# Patient Record
Sex: MALE | Race: WHITE | NOT HISPANIC OR LATINO | Employment: UNEMPLOYED | URBAN - METROPOLITAN AREA
[De-identification: names, ages, dates, MRNs, and addresses within clinical notes are randomized per-mention and may not be internally consistent; named-entity substitution may affect disease eponyms.]

---

## 2019-09-30 ENCOUNTER — HOSPITAL ENCOUNTER (EMERGENCY)
Facility: HOSPITAL | Age: 29
Discharge: PSYCHIATRIC OR SUBSTANCE USE FACILITY | End: 2019-10-01
Attending: EMERGENCY MEDICINE

## 2019-09-30 DIAGNOSIS — F10.929 ACUTE ALCOHOLIC INTOXICATION WITH COMPLICATION: ICD-10-CM

## 2019-09-30 DIAGNOSIS — R45.851 SUICIDAL IDEATION: Primary | ICD-10-CM

## 2019-09-30 DIAGNOSIS — F14.10 COCAINE ABUSE: ICD-10-CM

## 2019-09-30 DIAGNOSIS — F32.2 CURRENT SEVERE EPISODE OF MAJOR DEPRESSIVE DISORDER WITHOUT PSYCHOTIC FEATURES WITHOUT PRIOR EPISODE: ICD-10-CM

## 2019-09-30 LAB
ALBUMIN SERPL BCP-MCNC: 4.9 G/DL (ref 3.5–5.2)
ALP SERPL-CCNC: 59 U/L (ref 55–135)
ALT SERPL W/O P-5'-P-CCNC: 32 U/L (ref 10–44)
AMPHET+METHAMPHET UR QL: NEGATIVE
ANION GAP SERPL CALC-SCNC: 13 MMOL/L (ref 8–16)
APAP SERPL-MCNC: <10 UG/ML (ref 10–20)
AST SERPL-CCNC: 36 U/L (ref 10–40)
BARBITURATES UR QL SCN>200 NG/ML: NEGATIVE
BASOPHILS # BLD AUTO: 0.04 K/UL (ref 0–0.2)
BASOPHILS NFR BLD: 0.4 % (ref 0–1.9)
BENZODIAZ UR QL SCN>200 NG/ML: NEGATIVE
BILIRUB SERPL-MCNC: 0.7 MG/DL (ref 0.1–1)
BILIRUB UR QL STRIP: NEGATIVE
BUN SERPL-MCNC: 6 MG/DL (ref 6–20)
BZE UR QL SCN: ABNORMAL
CALCIUM SERPL-MCNC: 8.7 MG/DL (ref 8.7–10.5)
CANNABINOIDS UR QL SCN: NEGATIVE
CHLORIDE SERPL-SCNC: 106 MMOL/L (ref 95–110)
CLARITY UR: CLEAR
CO2 SERPL-SCNC: 21 MMOL/L (ref 23–29)
COLOR UR: YELLOW
CREAT SERPL-MCNC: 0.8 MG/DL (ref 0.5–1.4)
CREAT UR-MCNC: 14.9 MG/DL (ref 23–375)
DIFFERENTIAL METHOD: ABNORMAL
EOSINOPHIL # BLD AUTO: 0.1 K/UL (ref 0–0.5)
EOSINOPHIL NFR BLD: 1.1 % (ref 0–8)
ERYTHROCYTE [DISTWIDTH] IN BLOOD BY AUTOMATED COUNT: 11.9 % (ref 11.5–14.5)
EST. GFR  (AFRICAN AMERICAN): >60 ML/MIN/1.73 M^2
EST. GFR  (NON AFRICAN AMERICAN): >60 ML/MIN/1.73 M^2
ETHANOL SERPL-MCNC: 120 MG/DL
ETHANOL SERPL-MCNC: 22 MG/DL
ETHANOL SERPL-MCNC: 22 MG/DL
ETHANOL SERPL-MCNC: 321 MG/DL
GLUCOSE SERPL-MCNC: 92 MG/DL (ref 70–110)
GLUCOSE UR QL STRIP: NEGATIVE
HCT VFR BLD AUTO: 46.5 % (ref 40–54)
HGB BLD-MCNC: 17 G/DL (ref 14–18)
HGB UR QL STRIP: ABNORMAL
IMM GRANULOCYTES # BLD AUTO: 0.03 K/UL (ref 0–0.04)
IMM GRANULOCYTES NFR BLD AUTO: 0.3 % (ref 0–0.5)
KETONES UR QL STRIP: NEGATIVE
LEUKOCYTE ESTERASE UR QL STRIP: NEGATIVE
LYMPHOCYTES # BLD AUTO: 4.4 K/UL (ref 1–4.8)
LYMPHOCYTES NFR BLD: 44.9 % (ref 18–48)
MCH RBC QN AUTO: 33 PG (ref 27–31)
MCHC RBC AUTO-ENTMCNC: 36.6 G/DL (ref 32–36)
MCV RBC AUTO: 90 FL (ref 82–98)
MONOCYTES # BLD AUTO: 0.8 K/UL (ref 0.3–1)
MONOCYTES NFR BLD: 8 % (ref 4–15)
NEUTROPHILS # BLD AUTO: 4.4 K/UL (ref 1.8–7.7)
NEUTROPHILS NFR BLD: 45.3 % (ref 38–73)
NITRITE UR QL STRIP: NEGATIVE
NRBC BLD-RTO: 0 /100 WBC
OPIATES UR QL SCN: NEGATIVE
PCP UR QL SCN>25 NG/ML: NEGATIVE
PH UR STRIP: 6 [PH] (ref 5–8)
PLATELET # BLD AUTO: 194 K/UL (ref 150–350)
PMV BLD AUTO: 9.4 FL (ref 9.2–12.9)
POTASSIUM SERPL-SCNC: 3.5 MMOL/L (ref 3.5–5.1)
PROT SERPL-MCNC: 8.4 G/DL (ref 6–8.4)
PROT UR QL STRIP: NEGATIVE
RBC # BLD AUTO: 5.15 M/UL (ref 4.6–6.2)
SODIUM SERPL-SCNC: 140 MMOL/L (ref 136–145)
SP GR UR STRIP: <=1.005 (ref 1–1.03)
TOXICOLOGY INFORMATION: ABNORMAL
TSH SERPL DL<=0.005 MIU/L-ACNC: 2.65 UIU/ML (ref 0.34–5.6)
URN SPEC COLLECT METH UR: ABNORMAL
UROBILINOGEN UR STRIP-ACNC: NEGATIVE EU/DL
WBC # BLD AUTO: 9.69 K/UL (ref 3.9–12.7)

## 2019-09-30 PROCEDURE — 80320 DRUG SCREEN QUANTALCOHOLS: CPT

## 2019-09-30 PROCEDURE — 85025 COMPLETE CBC W/AUTO DIFF WBC: CPT

## 2019-09-30 PROCEDURE — 36415 COLL VENOUS BLD VENIPUNCTURE: CPT

## 2019-09-30 PROCEDURE — 80307 DRUG TEST PRSMV CHEM ANLYZR: CPT

## 2019-09-30 PROCEDURE — 25000003 PHARM REV CODE 250: Performed by: EMERGENCY MEDICINE

## 2019-09-30 PROCEDURE — 80053 COMPREHEN METABOLIC PANEL: CPT

## 2019-09-30 PROCEDURE — 80320 DRUG SCREEN QUANTALCOHOLS: CPT | Mod: 91

## 2019-09-30 PROCEDURE — 63600175 PHARM REV CODE 636 W HCPCS: Performed by: EMERGENCY MEDICINE

## 2019-09-30 PROCEDURE — 81003 URINALYSIS AUTO W/O SCOPE: CPT | Mod: 59

## 2019-09-30 PROCEDURE — 96374 THER/PROPH/DIAG INJ IV PUSH: CPT

## 2019-09-30 PROCEDURE — 99285 EMERGENCY DEPT VISIT HI MDM: CPT | Mod: 25

## 2019-09-30 PROCEDURE — 84443 ASSAY THYROID STIM HORMONE: CPT

## 2019-09-30 PROCEDURE — S4991 NICOTINE PATCH NONLEGEND: HCPCS | Performed by: EMERGENCY MEDICINE

## 2019-09-30 PROCEDURE — 80329 ANALGESICS NON-OPIOID 1 OR 2: CPT

## 2019-09-30 RX ORDER — IBUPROFEN 200 MG
1 TABLET ORAL
Status: ACTIVE | OUTPATIENT
Start: 2019-09-30 | End: 2019-10-01

## 2019-09-30 RX ORDER — IBUPROFEN 200 MG
TABLET ORAL
Status: DISPENSED
Start: 2019-09-30 | End: 2019-09-30

## 2019-09-30 RX ORDER — LORAZEPAM 2 MG/ML
1 INJECTION INTRAMUSCULAR
Status: COMPLETED | OUTPATIENT
Start: 2019-09-30 | End: 2019-09-30

## 2019-09-30 RX ADMIN — LORAZEPAM 1 MG: 2 INJECTION INTRAMUSCULAR; INTRAVENOUS at 04:09

## 2019-09-30 NOTE — ED NOTES
Patient awake sitting upright in bed. ED physician at bedside to speak with patient. Patient states feels anxious and depressed. Patient with thoughts of suicide. Patient states advised friends and family of his concerns and thoughts. Patient states willing to get placed in psychiatric services. Patient provided with meal tray at this time.

## 2019-09-30 NOTE — ED PROVIDER NOTES
Encounter Date: 9/30/2019       History     Chief Complaint   Patient presents with    Suicidal     29-year-old male presents with acute suicidal ideation stating that multiple life stressors have contributed to his current state  He has lost a prior job position secondary to alcoholism  He is current a bartending  He has multiple friends present in support of him  He appears acutely intoxicated  He does not appear to be distracted with any hallucinations  He has never had inpatient evaluation or stabilization            Review of patient's allergies indicates:   Allergen Reactions    Codeine Other (See Comments)     Ruptured blood vessels.     Lortab [hydrocodone-acetaminophen] Nausea And Vomiting     History reviewed. No pertinent past medical history.  History reviewed. No pertinent surgical history.  No family history on file.  Social History     Tobacco Use    Smoking status: Not on file   Substance Use Topics    Alcohol use: Not on file    Drug use: Not on file     Review of Systems   Constitutional: Negative.    HENT: Negative.    Respiratory: Negative.    Cardiovascular: Negative.    Gastrointestinal: Negative.    Genitourinary: Negative.    Musculoskeletal: Negative.    Skin: Negative.    Neurological: Negative.    Hematological: Negative.    Psychiatric/Behavioral: Positive for dysphoric mood, self-injury (A left wrist self-inflicted bite injury is present at the time of exam) and suicidal ideas. Negative for agitation, decreased concentration, hallucinations and sleep disturbance. The patient is not nervous/anxious and is not hyperactive.    All other systems reviewed and are negative.      Physical Exam     Initial Vitals [09/30/19 0154]   BP Pulse Resp Temp SpO2   (!) 153/103 (!) 111 16 98.4 °F (36.9 °C) 98 %      MAP       --         Physical Exam    Nursing note and vitals reviewed.  Constitutional: He appears well-developed and well-nourished. He is not diaphoretic. No distress.   HENT:   Head:  Normocephalic and atraumatic.   Mouth/Throat: Oropharynx is clear and moist.   Eyes: Conjunctivae and EOM are normal. Pupils are equal, round, and reactive to light.   Neck: Neck supple. Carotid bruit is not present. No JVD present.   Cardiovascular: Regular rhythm, normal heart sounds and intact distal pulses.  No extrasystoles are present.  Tachycardia present.  Exam reveals no gallop and no friction rub.    No murmur heard.  Pulses:       Radial pulses are 2+ on the right side, and 2+ on the left side.   Pulmonary/Chest: Breath sounds normal. No respiratory distress. He has no decreased breath sounds. He has no wheezes. He has no rhonchi. He has no rales. He exhibits no tenderness.   Abdominal: Soft. Bowel sounds are normal. He exhibits no distension and no mass. There is no tenderness. There is no rebound and no guarding.   Musculoskeletal: Normal range of motion. He exhibits no edema or tenderness.   Neurological: He is alert and oriented to person, place, and time. He has normal strength. No cranial nerve deficit or sensory deficit. GCS score is 15. GCS eye subscore is 4. GCS verbal subscore is 5. GCS motor subscore is 6.   Skin: Skin is warm and dry. Capillary refill takes less than 2 seconds. No rash noted. No erythema. No pallor.   Psychiatric: His affect is blunt. His speech is delayed and slurred. He is slowed. He is not agitated, not aggressive, not hyperactive, not withdrawn and not combative. Thought content is not paranoid and not delusional. Cognition and memory are normal. He expresses impulsivity. He does not express inappropriate judgment. He exhibits a depressed mood. He expresses suicidal ideation. He expresses no homicidal ideation. He expresses suicidal plans. He expresses no homicidal plans.         ED Course   Procedures  Labs Reviewed   CBC W/ AUTO DIFFERENTIAL - Abnormal; Notable for the following components:       Result Value    Mean Corpuscular Hemoglobin 33.0 (*)     Mean Corpuscular  Hemoglobin Conc 36.6 (*)     All other components within normal limits   COMPREHENSIVE METABOLIC PANEL - Abnormal; Notable for the following components:    CO2 21 (*)     All other components within normal limits   URINALYSIS, REFLEX TO URINE CULTURE - Abnormal; Notable for the following components:    Occult Blood UA Trace (*)     All other components within normal limits    Narrative:     Preferred Collection Type->Urine, Clean Catch   DRUG SCREEN PANEL, URINE EMERGENCY - Abnormal; Notable for the following components:    Creatinine, Random Ur 14.9 (*)     All other components within normal limits    Narrative:     Preferred Collection Type->Urine, Clean Catch   ALCOHOL,MEDICAL (ETHANOL) - Abnormal; Notable for the following components:    Alcohol, Medical, Serum 321 (*)     All other components within normal limits    Narrative:     ETOH critical result(s) called and verbal readback obtained from   Sandra Rod RN ED. , 09/30/2019 03:38   ACETAMINOPHEN LEVEL   TSH         Admission on 09/30/2019   Component Date Value Ref Range Status    WBC 09/30/2019 9.69  3.90 - 12.70 K/uL Final    RBC 09/30/2019 5.15  4.60 - 6.20 M/uL Final    Hemoglobin 09/30/2019 17.0  14.0 - 18.0 g/dL Final    Hematocrit 09/30/2019 46.5  40.0 - 54.0 % Final    Mean Corpuscular Volume 09/30/2019 90  82 - 98 fL Final    Mean Corpuscular Hemoglobin 09/30/2019 33.0* 27.0 - 31.0 pg Final    Mean Corpuscular Hemoglobin Conc 09/30/2019 36.6* 32.0 - 36.0 g/dL Final    RDW 09/30/2019 11.9  11.5 - 14.5 % Final    Platelets 09/30/2019 194  150 - 350 K/uL Final    MPV 09/30/2019 9.4  9.2 - 12.9 fL Final    Immature Granulocytes 09/30/2019 0.3  0.0 - 0.5 % Final    Gran # (ANC) 09/30/2019 4.4  1.8 - 7.7 K/uL Final    Immature Grans (Abs) 09/30/2019 0.03  0.00 - 0.04 K/uL Final    Comment: Mild elevation in immature granulocytes is non specific and   can be seen in a variety of conditions including stress response,   acute  inflammation, trauma and pregnancy. Correlation with other   laboratory and clinical findings is essential.      Lymph # 09/30/2019 4.4  1.0 - 4.8 K/uL Final    Mono # 09/30/2019 0.8  0.3 - 1.0 K/uL Final    Eos # 09/30/2019 0.1  0.0 - 0.5 K/uL Final    Baso # 09/30/2019 0.04  0.00 - 0.20 K/uL Final    nRBC 09/30/2019 0  0 /100 WBC Final    Gran% 09/30/2019 45.3  38.0 - 73.0 % Final    Lymph% 09/30/2019 44.9  18.0 - 48.0 % Final    Mono% 09/30/2019 8.0  4.0 - 15.0 % Final    Eosinophil% 09/30/2019 1.1  0.0 - 8.0 % Final    Basophil% 09/30/2019 0.4  0.0 - 1.9 % Final    Differential Method 09/30/2019 Automated   Final    Sodium 09/30/2019 140  136 - 145 mmol/L Final    Potassium 09/30/2019 3.5  3.5 - 5.1 mmol/L Final    Chloride 09/30/2019 106  95 - 110 mmol/L Final    CO2 09/30/2019 21* 23 - 29 mmol/L Final    Glucose 09/30/2019 92  70 - 110 mg/dL Final    BUN, Bld 09/30/2019 6  6 - 20 mg/dL Final    Creatinine 09/30/2019 0.8  0.5 - 1.4 mg/dL Final    Calcium 09/30/2019 8.7  8.7 - 10.5 mg/dL Final    Total Protein 09/30/2019 8.4  6.0 - 8.4 g/dL Final    Albumin 09/30/2019 4.9  3.5 - 5.2 g/dL Final    Total Bilirubin 09/30/2019 0.7  0.1 - 1.0 mg/dL Final    Comment: For infants and newborns, interpretation of results should be based  on gestational age, weight and in agreement with clinical  observations.  Premature Infant recommended reference ranges:  Up to 24 hours.............<8.0 mg/dL  Up to 48 hours............<12.0 mg/dL  3-5 days..................<15.0 mg/dL  6-29 days.................<15.0 mg/dL      Alkaline Phosphatase 09/30/2019 59  55 - 135 U/L Final    AST 09/30/2019 36  10 - 40 U/L Final    ALT 09/30/2019 32  10 - 44 U/L Final    Anion Gap 09/30/2019 13  8 - 16 mmol/L Final    eGFR if African American 09/30/2019 >60.0  >60 mL/min/1.73 m^2 Final    eGFR if non African American 09/30/2019 >60.0  >60 mL/min/1.73 m^2 Final    Comment: Calculation used to obtain the estimated  glomerular filtration  rate (eGFR) is the CKD-EPI equation.       Specimen UA 09/30/2019 Urine, Clean Catch   Final    Color, UA 09/30/2019 Yellow  Yellow, Straw, [place] Final    Appearance, UA 09/30/2019 Clear  Clear Final    pH, UA 09/30/2019 6.0  5.0 - 8.0 Final    Specific Gravity, UA 09/30/2019 <=1.005  1.005 - 1.030 Final    Protein, UA 09/30/2019 Negative  Negative Final    Comment: Recommend a 24 hour urine protein or a urine   protein/creatinine ratio if globulin induced proteinuria is  clinically suspected.      Glucose, UA 09/30/2019 Negative  Negative Final    Ketones, UA 09/30/2019 Negative  Negative Final    Bilirubin (UA) 09/30/2019 Negative  Negative Final    Occult Blood UA 09/30/2019 Trace* Negative Final    Nitrite, UA 09/30/2019 Negative  Negative Final    Urobilinogen, UA 09/30/2019 Negative  Negative EU/dL Final    Leukocytes, UA 09/30/2019 Negative  Negative Final    Benzodiazepines 09/30/2019 Negative   Final    Cocaine (Metab.) 09/30/2019 Presumptive Positive   Final    Opiate Scrn, Ur 09/30/2019 Negative   Final    Barbiturate Screen, Ur 09/30/2019 Negative   Final    Amphetamine Screen, Ur 09/30/2019 Negative   Final    THC 09/30/2019 Negative   Final    Phencyclidine 09/30/2019 Negative   Final    Creatinine, Random Ur 09/30/2019 14.9* 23.0 - 375.0 mg/dL Final    Comment: The random urine reference ranges provided were established   for 24 hour urine collections.  No reference ranges exist for  random urine specimens.  Correlate clinically.      Toxicology Information 09/30/2019 SEE COMMENT   Final    Comment: This screen includes the following classes of drugs at the   listed cut-off:  Benzodiazepines                  200 ng/ml  Cocaine metabolite               300 ng/ml  Opiates                         2000 ng/ml  Barbiturates                     200 ng/ml  Amphetamines                    1000 ng/ml  Marijuana metabs (THC)            50 ng/ml  Phencyclidine (PCP)                25 ng/ml  High concentrations of Methylenedioxymethamphetamine (MDMA aka  Ectasy) and other structurally similar compounds may cross-   react with the Amphetamine/Methamphetamine screening   immunoassay giving a false positive result.  Note: This exception list includes only more common   interferants in toxicology screen testing.  Because of many   cross-reactantspositive results on toxicology drug screens   should be confirmed whenever results do not correlate with   clinical presentation.  This report is intended for use in clinical monitoring and  management of patients. It is not intended for use in   employment                            related drug testing.  Because of any cross-reactants, positive results on toxicology  drug screens should be confirmed whenever results do not  correlate with clinical presentation.  Presumptive positive results are unconfirmed and may be used   only for medical purposes.      Alcohol, Medical, Serum 09/30/2019 321* <10 mg/dL Final    Comment: ETOH critical result(s) called and verbal readback obtained from   Sandra Rod RN ED. , 09/30/2019 03:38      Acetaminophen (Tylenol), Serum 09/30/2019 <10.0  10.0 - 20.0 ug/mL Final    Comment: Toxic Levels:  Adults (4 hr post-ingestion).........>150 ug/mL  Adults (12 hr post-ingestion)........>40 ug/mL  Peds (2 hr post-ingestion, liquid)...>225 ug/mL           Imaging Results    None             Additional MDM:   Psych: The patient has been medically cleared.          acute alcohol intoxication and cocaine abuse patient has been observed in the emergency department many hours and has finally placed with mental health for inpatient evaluation stabilization        ED Course as of Oct 10 1113   Mon Sep 30, 2019   1111 Patient states he has been dealing with anxiety and worsening depression and has felt a loss of control and impulsivity recently resulting in heavy alcohol use, he is voluntarily asking for psychiatric  evaluation    [WK]      ED Course User Index  [WK] Robbie Ortiz MD     Clinical Impression:       ICD-10-CM ICD-9-CM   1. Suicidal ideation R45.851 V62.84   2. Cocaine abuse F14.10 305.60   3. Acute alcoholic intoxication with complication F10.929 305.00                                Brandon Ames MD  09/30/19 0401       Brandon Ames MD  10/10/19 1114

## 2019-09-30 NOTE — ED NOTES
Report received on patient. Patient sleeping. Even rise and fall of chest. Sitter monitoring patient 1:1 from doorway.

## 2019-09-30 NOTE — ED NOTES
Physician at bedside.  Pt has new bite alisa to left forearm that was not present at time of triage.

## 2019-09-30 NOTE — ED NOTES
"PATIENT SITTING UP IN BED, REQUESTING "BANANA BAG" RN INFORMED PATIENT THAT REQUEST WAS MADE TO DR LAROSN AND BANANA BAG WAS NOT NEEDED AT PRESENT TIME. PATIENT THEN REQUESTED ICE WATER. ANTIONE MOMIN RN  "

## 2019-09-30 NOTE — ED NOTES
FAMILY LEFT DEPARTMENT. PATIENT R SIDE LYING POSITION. EYES CLOSED. NO DISTRESS NOTED. ANTIONE MOMIN RN

## 2019-09-30 NOTE — ED PROVIDER NOTES
Encounter Date: 9/30/2019       History     Chief Complaint   Patient presents with    Suicidal     29-year-old male presents initially intoxicated state with suicidal ideation stating he has had problems with anxiety and depression for while but has gotten out of hand recently and he feels he is having impulsive thoughts and action including alcohol abuse        Review of patient's allergies indicates:   Allergen Reactions    Codeine Other (See Comments)     Ruptured blood vessels.     Lortab [hydrocodone-acetaminophen] Nausea And Vomiting     History reviewed. No pertinent past medical history.  History reviewed. No pertinent surgical history.  No family history on file.  Social History     Tobacco Use    Smoking status: Not on file   Substance Use Topics    Alcohol use: Not on file    Drug use: Not on file     Review of Systems   Constitutional: Negative for fever.   HENT: Negative for sore throat.    Respiratory: Negative for shortness of breath.    Cardiovascular: Negative for chest pain.   Gastrointestinal: Negative for nausea.   Genitourinary: Negative for dysuria.   Musculoskeletal: Negative for back pain.   Skin: Negative for rash.   Neurological: Negative for weakness.   Hematological: Does not bruise/bleed easily.   Psychiatric/Behavioral: Positive for dysphoric mood, sleep disturbance and suicidal ideas. Negative for hallucinations. The patient is nervous/anxious.        Physical Exam     Initial Vitals [09/30/19 0154]   BP Pulse Resp Temp SpO2   (!) 153/103 (!) 111 16 98.4 °F (36.9 °C) 98 %      MAP       --         Physical Exam    Nursing note and vitals reviewed.  Constitutional: He appears well-developed and well-nourished. He is not diaphoretic. No distress.   HENT:   Head: Normocephalic and atraumatic.   Right Ear: External ear normal.   Left Ear: External ear normal.   Nose: Nose normal.   Mouth/Throat: Oropharynx is clear and moist. No oropharyngeal exudate.   Eyes: EOM are normal.   Neck:  Normal range of motion. Neck supple. No tracheal deviation present.   Cardiovascular: Normal rate and regular rhythm.   No murmur heard.  Pulmonary/Chest: Breath sounds normal. No stridor. No respiratory distress. He has no rales.   Abdominal: Soft. He exhibits no distension and no mass. There is no tenderness. There is no rebound.   Musculoskeletal: Normal range of motion. He exhibits no edema.   Lymphadenopathy:     He has no cervical adenopathy.   Neurological: He is alert and oriented to person, place, and time. He has normal strength.   Skin: Skin is warm and dry. Capillary refill takes less than 2 seconds. No pallor.   Psychiatric:   Patient has flat affect no flight of ideas no auditory or visual hallucinations         ED Course   Procedures  Labs Reviewed   CBC W/ AUTO DIFFERENTIAL - Abnormal; Notable for the following components:       Result Value    Mean Corpuscular Hemoglobin 33.0 (*)     Mean Corpuscular Hemoglobin Conc 36.6 (*)     All other components within normal limits   COMPREHENSIVE METABOLIC PANEL - Abnormal; Notable for the following components:    CO2 21 (*)     All other components within normal limits   URINALYSIS, REFLEX TO URINE CULTURE - Abnormal; Notable for the following components:    Occult Blood UA Trace (*)     All other components within normal limits    Narrative:     Preferred Collection Type->Urine, Clean Catch   DRUG SCREEN PANEL, URINE EMERGENCY - Abnormal; Notable for the following components:    Creatinine, Random Ur 14.9 (*)     All other components within normal limits    Narrative:     Preferred Collection Type->Urine, Clean Catch   ALCOHOL,MEDICAL (ETHANOL) - Abnormal; Notable for the following components:    Alcohol, Medical, Serum 321 (*)     All other components within normal limits    Narrative:     ETOH critical result(s) called and verbal readback obtained from   Sandra Rod RN ED. , 09/30/2019 03:38   ALCOHOL,MEDICAL (ETHANOL) - Abnormal; Notable for the  following components:    Alcohol, Medical, Serum 120 (*)     All other components within normal limits   ALCOHOL,MEDICAL (ETHANOL) - Abnormal; Notable for the following components:    Alcohol, Medical, Serum 22 (*)     All other components within normal limits   ALCOHOL,MEDICAL (ETHANOL) - Abnormal; Notable for the following components:    Alcohol, Medical, Serum 22 (*)     All other components within normal limits   TSH   ACETAMINOPHEN LEVEL          Imaging Results    None                            ED Course as of Sep 30 1526   Mon Sep 30, 2019   1111 Patient states he has been dealing with anxiety and worsening depression and has felt a loss of control and impulsivity recently resulting in heavy alcohol use, he is voluntarily asking for psychiatric evaluation    [WK]      ED Course User Index  [WK] Robbie Ortiz MD     Clinical Impression:       ICD-10-CM ICD-9-CM   1. Suicidal ideation R45.851 V62.84   2. Cocaine abuse F14.10 305.60   3. Acute alcoholic intoxication with complication F10.929 305.00   4. 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

## 2019-09-30 NOTE — ED NOTES
PATIENT AMBULATORY TO RESTROOM UNDER DIRECT OBSERVATION. NO OTHER NEEDS AT PRESENT TIME. ANTIONE MOMIN RN

## 2019-10-01 VITALS
TEMPERATURE: 98 F | OXYGEN SATURATION: 98 % | WEIGHT: 185 LBS | HEART RATE: 100 BPM | BODY MASS INDEX: 26.48 KG/M2 | HEIGHT: 70 IN | DIASTOLIC BLOOD PRESSURE: 97 MMHG | SYSTOLIC BLOOD PRESSURE: 137 MMHG | RESPIRATION RATE: 16 BRPM

## 2019-10-01 PROCEDURE — S4991 NICOTINE PATCH NONLEGEND: HCPCS | Performed by: EMERGENCY MEDICINE

## 2019-10-01 PROCEDURE — 25000003 PHARM REV CODE 250: Performed by: EMERGENCY MEDICINE

## 2019-10-01 RX ORDER — IBUPROFEN 200 MG
TABLET ORAL
Status: DISCONTINUED
Start: 2019-10-01 | End: 2019-10-01 | Stop reason: HOSPADM

## 2019-10-01 RX ORDER — IBUPROFEN 200 MG
1 TABLET ORAL
Status: DISCONTINUED | OUTPATIENT
Start: 2019-10-01 | End: 2019-10-01 | Stop reason: HOSPADM

## 2019-10-01 RX ORDER — LORAZEPAM 0.5 MG/1
1 TABLET ORAL
Status: COMPLETED | OUTPATIENT
Start: 2019-10-01 | End: 2019-10-01

## 2019-10-01 RX ADMIN — NICOTINE 1 PATCH: 21 PATCH, EXTENDED RELEASE TRANSDERMAL at 06:10

## 2019-10-01 RX ADMIN — LORAZEPAM 1 MG: 0.5 TABLET ORAL at 06:10

## 2019-10-01 RX ADMIN — LORAZEPAM 1 MG: 0.5 TABLET ORAL at 12:10

## 2019-10-01 NOTE — ED NOTES
Patient accepted to [facility] to Dr. Doss. Number for report is [phone].    RN calls [facility], spoke with Maria C. She estimates that it will be a 0500 morning truck that will come to ED to transport the patient.

## 2019-10-01 NOTE — ED NOTES
Pt escorted to [facility] room for bathing and ADL's.  Pt monitored during self-hygiene by RN and .

## 2019-10-01 NOTE — ED NOTES
ASSUMED CARE OF PATIENT, REPORT RECEIVED FROM KIMI ELLIS. PATIENT IN POSITION OF COMFORT. AWAITING ACCEPTING FACILITY. ANTIONE MOMIN RN

## 2019-10-01 NOTE — ED NOTES
SPOKE TO MOTHER AND FATHER FOR UPDATE, STATED WILL BE HERE IN THE MORNING FOR VISITATION. ANTIONE MOMIN RN

## 2019-10-01 NOTE — ED NOTES
COMFORT MEASURES TAKEN, COKE PROVIDED PER REQUEST, PATIENT REQUESTING NEW NICOTINE PATCH. RATES ANXIETY 5/10. NO OTHER NEEDS AT PRESENT TIME, WILL CONTINUE TO MONITOR. ANTIONE MOMIN RN
